# Patient Record
(demographics unavailable — no encounter records)

---

## 2017-06-21 NOTE — PHYS DOC
Past Medical History


Past Medical History:  Alcoholism, COPD, Diabetes-Type II, High Cholesterol, 

Hypertension


Past Surgical History:  No Surgical History


Alcohol Use:  Heavy


Drug Use:  None





Adult General


Chief Complaint


Chief Complaint:  ALCOHOL INTOXICATION





HPI


HPI





Patient is a 57  year old male who presents by EMS for alcohol intoxication 

seeking alcohol rehabilitation/detox.  He acknowledges psychosocial stressors 

of drinking and wants to quit.  His last drink was this a.m.  He notes prior 

detox attempts.  He denies other complaints at this time.





Review of Systems


Review of Systems





Constitutional: Denies fever or chills []


Eyes: Denies change in visual acuity, redness, or eye pain []


HENT: Denies nasal congestion or sore throat []


Respiratory: Denies cough or shortness of breath []


Cardiovascular: No additional information not addressed in HPI []


GI: Denies abdominal pain, nausea, vomiting, bloody stools or diarrhea []


: Denies dysuria or hematuria []


Musculoskeletal: Denies back pain or joint pain []


Integument: Denies rash or skin lesions []


Neurologic: Denies headache, focal weakness or sensory changes []


Endocrine: Denies polyuria or polydipsia []





Current Medications


Current Medications





Current Medications








 Medications


  (Trade)  Dose


 Ordered  Sig/Ludmila  Start Time


 Stop Time Status Last Admin


Dose Admin


 


 Sodium Chloride  1,000 ml @ 


 1,000 mls/hr  1X  ONCE  6/21/17 16:00


 6/21/17 16:59 DC 6/21/17 16:09


1,000 MLS/HR











Allergies


Allergies





Allergies








Coded Allergies Type Severity Reaction Last Updated Verified


 


  No Known Drug Allergies    6/21/17 No











Physical Exam


Physical Exam





Constitutional: Well developed, well nourished, no acute distress, non-toxic 

appearance. []


HENT: Normocephalic, atraumatic, bilateral external ears normal, oropharynx 

moist, no oral exudates, nose normal. []


Eyes: PERRLA, EOMI, conjunctiva normal, no discharge. [] 


Neck: Normal range of motion, supple. [] 


Cardiovascular:Heart rate regular rhythm []


Lungs & Thorax:  Bilateral breath sounds clear to auscultation []


Abdomen: Bowel sounds normal, soft, no tenderness. [] 


Skin: Warm, dry, no erythema, no rash. [] 


Back: Normal ROM. [] 


Extremities: No tenderness, ROM intact, no edema. [] 


Neurologic: Alert and oriented X 3, normal motor function, normal sensory 

function, no focal deficits noted. []


Psychologic: Affect normal, judgement normal, mood normal. []





Current Patient Data


Vital Signs





 Vital Signs








  Date Time  Temp Pulse Resp B/P (MAP) Pulse Ox O2 Delivery O2 Flow Rate FiO2


 


6/21/17 15:26 98.6 111 20 177/101 (126) 92 Room Air  





 98.6       








Lab Values





 Laboratory Tests








Test


  6/21/17


15:30 6/21/17


16:30


 


White Blood Count


  3.6 x10^3/uL


(4.0-11.0)  L 


 


 


Red Blood Count


  4.66 x10^6/uL


(4.30-5.70) 


 


 


Hemoglobin


  14.8 g/dL


(13.0-17.5) 


 


 


Hematocrit


  42.8 %


(39.0-53.0) 


 


 


Mean Corpuscular Volume


  92 fL ()


  


 


 


Mean Corpuscular Hemoglobin 32 pg (25-35)   


 


Mean Corpuscular Hemoglobin


Concent 35 g/dL


(31-37) 


 


 


Red Cell Distribution Width


  16.1 %


(11.5-14.5)  H 


 


 


Platelet Count


  127 x10^3/uL


(140-400)  L 


 


 


Neutrophils (%) (Auto) 62 % (31-73)   


 


Lymphocytes (%) (Auto) 26 % (24-48)   


 


Monocytes (%) (Auto) 10 % (0-9)  H 


 


Eosinophils (%) (Auto) 1 % (0-3)   


 


Basophils (%) (Auto) 1 % (0-3)   


 


Neutrophils # (Auto)


  2.3 x10^3uL


(1.8-7.7) 


 


 


Lymphocytes # (Auto)


  0.9 x10^3/uL


(1.0-4.8)  L 


 


 


Monocytes # (Auto)


  0.4 x10^3/uL


(0.0-1.1) 


 


 


Eosinophils # (Auto)


  0.0 x10^3/uL


(0.0-0.7) 


 


 


Basophils # (Auto)


  0.0 x10^3/uL


(0.0-0.2) 


 


 


Sodium Level


  136 mmol/L


(136-145) 


 


 


Potassium Level


  3.6 mmol/L


(3.5-5.1) 


 


 


Chloride Level


  94 mmol/L


()  L 


 


 


Carbon Dioxide Level


  25 mmol/L


(21-32) 


 


 


Anion Gap 17 (6-14)  H 


 


Blood Urea Nitrogen


  18 mg/dL


(8-26) 


 


 


Creatinine


  1.1 mg/dL


(0.7-1.3) 


 


 


Estimated GFR


(Cockcroft-Gault) 69.0  


  


 


 


Glucose Level


  186 mg/dL


(70-99)  H 


 


 


Calcium Level


  8.7 mg/dL


(8.5-10.1) 


 


 


Ethyl Alcohol Level


  288 mg/dL


(0-10)  H 


 


 


Urine Opiates Screen  Neg (NEG)  


 


Urine Methadone Screen  Neg (NEG)  


 


Urine Barbiturates  Neg (NEG)  


 


Urine Phencyclidine Screen  Neg (NEG)  


 


Urine


Amphetamine/Methamphetamine 


  Neg (NEG)  


 


 


Urine Benzodiazepines Screen  Neg (NEG)  


 


Urine Cocaine Screen  Neg (NEG)  


 


Urine Cannabinoids Screen  Neg (NEG)  


 


Urine Ethyl Alcohol  Pos (NEG)  





 Laboratory Tests


6/21/17 15:30








 Laboratory Tests


6/21/17 15:30














Course & Med Decision Making


Course & Med Decision Making


Pertinent Labs and Imaging studies reviewed. (See chart for details)





Other than elevated alcohol level, laboratory evaluation is unremarkable. PAT 

 has seen and evaluated him. There are no detox beds available at 

this time. Resources have been provided to follow-up with detox facilities 

tomorrow. He will be prescribed Librium in anticipation of detox facility 

admission for alcohol withdrawal. Instructed him not to use this prescription 

outside of detox facility. Return precautions given. He understands and agrees 

with plan.





Dragon Disclaimer


Dragon Disclaimer


This electronic medical record was generated, in whole or in part, using a 

voice recognition dictation system.





Departure


Departure


Impression:  


 Primary Impression:  


 Alcohol intoxication


Disposition:  01 HOME, SELF-CARE


Condition:  STABLE


Referrals:  


NO PCP (PCP)


Patient Instructions:  Alcohol Intoxication, Easy-to-Read





Additional Instructions:  


You have been supplied resources for substance abuse facility. Please follow up 

with substance abuse facility for help with alcohol detox. Take Librium at 

alcohol detox facility, otherwise decrease your alcohol intake gradually. Follow

-up with your primary care doctor otherwise. Return for any concerns.


Scripts


Chlordiazepoxide Hcl (CHLORDIAZEPOXIDE HCL) 10 Mg Capsule


10 MG PO QID Y for ALCOHOL WITHDRAWAL, #12 CAP


   Prov: GAL SANTORO MD         6/21/17





Problem Qualifiers








 Primary Impression:  


 Alcohol intoxication


 Complication of substance-induced condition:  uncomplicated  Qualified Codes:  

F10.920 - Alcohol use, unspecified with intoxication, uncomplicated








GAL SANTORO MD Jun 21, 2017 16:11

## 2017-08-13 NOTE — RAD
EXAM: CT head without contrast

 

HISTORY: 57-year-old male with seizure, post alcohol withdrawal.

 

COMPARISON: None.

 

TECHNIQUE: Computed tomographic images of the head were obtained without 

contrast.

 

RS compliance statement:  One or more of the following individualized 

dose reduction techniques were utilized for this examination:  1. 

Automated exposure control  2. Adjustment of the mA and/or kV according to

patient size  3. Use of iterative reconstruction technique

 

FINDINGS: 

There is no acute intracranial process identified. Specifically, there are

no intracranial blood products, extra-axial fluid collections, mass effect

or midline shift.  Ventricles and basilar cisterns are maintained. Mild 

cerebral atrophy is present, with resulting prominence of the sulci and 

ventricles.

 

The visualized portions of the orbits, paranasal sinuses and mastoid air 

cells are unremarkable. No suspicious calvarial lesion is seen.

 

IMPRESSION: 

No acute intracranial findings.

 

Electronically signed by: Lisa Toney MD (8/13/2017 1:07 AM) [facility]

## 2017-08-13 NOTE — EKG
[facility]

              [address] [place] [address]

Test Date:    2017               Test Time:    00:46:41

Pat Name:     MATT ALCARAZ          Department:   

Patient ID:   [id number]           Room:          

Gender:       M                        Technician:   

:          1959               Requested By: CAROL ANDREW

Order Number: [id number]            Reading MD:     

                                 Measurements

Intervals                              Axis          

Rate:         101                      P:            42

CA:           136                      QRS:          -58

QRSD:         126                      T:            32

QT:           366                                    

QTc:          475                                    

                           Interpretive Statements

SINUS TACHYCARDIA

ABNORMAL LEFT AXIS DEVIATION

RIGHT BUNDLE BRANCH BLOCK

RVH WITH REPOLARIZATION ABNORMALITY

QRS(T) CONTOUR ABNORMALITY

CONSISTENT WITH INFERIOR INFARCT

PROBABLY OLD

RI6.01          Unconfirmed report

No previous ECG available for comparison

## 2017-08-13 NOTE — PHYS DOC
Past Medical History


Past Medical History:  Alcoholism, COPD, Diabetes-Type II, High Cholesterol, 

Hypertension


Past Surgical History:  No Surgical History


Alcohol Use:  Heavy


Additional Information:  


PT REPORTS HE HAS NOT HAD A DRINK IN THE LAST 6 DAYS.


Drug Use:  None





Adult General


Chief Complaint


Chief Complaint:  SEIZURE





HPI


HPI





Patient is a 57  year old gentleman who presents to the ER today secondary to a 

seizure was witnessed by his wife. Patient has a history significant for 

diabetes and hypertension. Patient is a history of high cholesterol and COPD. 

Patient denies any liver  kidney problems. Patient denies any prior seizures in 

the past. Patient denies any drug use. Patient reports he is not allergic to 

any medications. Patient reports he does smoke. She reports that he is a heavy 

alcoholic. Patient reports he is attempting to detox himself at this time. 

Patient reports his last drink was 6 days ago. Patient reports that he has had 

episodes of withdrawal symptoms in the past but has never had any seizures. 

Patient denies any fevers shakes chills nausea vomiting or diarrhea. Patient 

has any abdominal pain chest pain or short of breath. Patient reports that he 

does use inhalers for his asthma. Patient reports that he lives with his wife. 

Patient reports that he's been having tremors over the last couple days which 

is not unusual for him.








Review of systems:


Constitutional: Denies fever or chills          


Eyes: Denies change in visual acuity, redness, or eye pain   


HENT: Denies nasal congestion or sore throat 


All other review systems are negative except as documented in the history of 

present illness portion.








Physical exam:


Constitutional: Well developed, well nourished, no acute distress, non-toxic 

appearance. Patient does have a resting hand tremor


HENT: Normocephalic, atraumatic, bilateral external ears normal, 


Eyes:  EOMI, conjunctiva normal, no discharge.  No tongue fasciculations


Neck: Normal range of motion, no tenderness, supple, no stridor.  


Cardiovascular: Regular rate tachycardic to 105


Lungs & Thorax:  Bilateral breath sounds clear to auscultation no wheezing 

rales or rhonchi


Abdomen: Bowel sounds normal, soft, no tenderness, 


Skin: Warm, dry, no erythema, no rash.  No diaphoresis


Back: No tenderness, no CVA tenderness.  


Extremities:  ROM intact, no edema.  


Neurologic: Alert and oriented X 3, normal motor function, normal sensory 

function, no focal deficits noted. Normal gait. Normal cerebellar exam. Patient 

does have a resting tremor.


Psychologic: Affect normal, judgement normal, mood normal. 





Patient's evaluation in the ER was significant for a resting tremor. Patient is 

tachycardic upon arrival. Patient is alert awake and oriented 3. Does not 

appear to be postictal at this time. Patient has no tongue bite. Patient not 

lose bowel or bladder function. Patient denies any head trauma.


0130  patient reports he feels much improved. Patient was given Ativan in the 

ED as well as IV fluids. Patient reports it is resting tremor has improved 

significantly. Patient is requesting to be discharged. I have discussed with 

the patient the plan to admit him for further evaluation of his seizures and 

his alcohol withdrawal. Patient is refusing admission. Patient has agreed to 

stay in the ER for an additional hour for me to continue watching him however 

he will sign out AGAINST MEDICAL ADVICE after that.





Patient's CT scan of his head reveals no acute pathology.


Patient's lab workup in ER has been unremarkable. Patient is slightly 

hypermobile anemic and hypomagnesemia.  Patient has received IV fluids through 

a banana bag as well as Ativan in the ER.


Patient reports that he does not want assistance with detox. Patient reports 

that he's had multiple episodes of unsuccessful detox in the past and is 

greatest excesses when he doesn't on his own. We did have the psychiatric 

assessment team evaluate him and they did give him resources. Patient was again 

has declined any assistance from us regarding his detox.





0250  patient is standing at the doorway requesting to be discharged. Patient 

once again is refusing admission to the hospital. I did discuss with the 

patient my concern with his new onset seizures, his alcohol withdrawal, my 

concern that he might fall and hurt himself. Patient is alert awake and 

oriented 3 currently. Patient has capacity for medical decision making. 

Patient is competent to make his own decisions at this time. We will allow him 

to make his own decision to sign out AGAINST MEDICAL ADVICE. We will respect 

his autonomy and allow him to be discharged AGAINST MEDICAL ADVICE. Patient 

understands that he may return at any time for further assistance. Patient 

lives with his wife and he reports that his wife. Benign.








Assessment and plan


Alcohol withdrawal with seizures. Patient monitored in the ER for several hours 

with some improvement in his symptoms. I have recommended admission to the 

patient however he is refusing. Patient will be discharged AGAINST MEDICAL 

ADVICE and was instructed to return to the ER a few changes mind. Patient has 

been given instructions and resources for alcohol resources.





Current Medications


Current Medications





Current Medications








 Medications


  (Trade)  Dose


 Ordered  Sig/Ludmila  Start Time


 Stop Time Status Last Admin


Dose Admin


 


 Lorazepam


  (Ativan)  2 mg  1X  ONCE  8/13/17 01:00


 8/13/17 01:01 DC 8/13/17 01:05


2 MG


 


 Multivitamins 10


 ml/Folic Acid 1


 mg/Thiamine HCl


 100 mg/Sodium


 Chloride  1,011.2 ml


  @ 1,000 mls/


 hr  1X  ONCE  8/13/17 01:00


 8/13/17 02:00 DC 8/13/17 01:18


1,000 MLS/HR











Allergies


Allergies





Allergies








Coded Allergies Type Severity Reaction Last Updated Verified


 


  No Known Drug Allergies    6/21/17 No











Current Patient Data


Vital Signs





 Vital Signs








  Date Time  Temp Pulse Resp B/P (MAP) Pulse Ox O2 Delivery O2 Flow Rate FiO2


 


8/13/17 00:23 99.2 117 16 154/92 (112) 100 Room Air  





 99.2       








Lab Values





 Laboratory Tests








Test


  8/13/17


00:45 8/13/17


01:51


 


White Blood Count


  7.2 x10^3/uL


(4.0-11.0) 


 


 


Red Blood Count


  4.23 x10^6/uL


(4.30-5.70)  L 


 


 


Hemoglobin


  13.8 g/dL


(13.0-17.5) 


 


 


Hematocrit


  39.4 %


(39.0-53.0) 


 


 


Mean Corpuscular Volume


  93 fL ()


  


 


 


Mean Corpuscular Hemoglobin 33 pg (25-35)   


 


Mean Corpuscular Hemoglobin


Concent 35 g/dL


(31-37) 


 


 


Red Cell Distribution Width


  15.8 %


(11.5-14.5)  H 


 


 


Platelet Count


  154 x10^3/uL


(140-400) 


 


 


Neutrophils (%) (Auto) 65 % (31-73)   


 


Lymphocytes (%) (Auto) 19 % (24-48)  L 


 


Monocytes (%) (Auto) 13 % (0-9)  H 


 


Eosinophils (%) (Auto) 2 % (0-3)   


 


Basophils (%) (Auto) 1 % (0-3)   


 


Neutrophils # (Auto)


  4.7 x10^3uL


(1.8-7.7) 


 


 


Lymphocytes # (Auto)


  1.4 x10^3/uL


(1.0-4.8) 


 


 


Monocytes # (Auto)


  1.0 x10^3/uL


(0.0-1.1) 


 


 


Eosinophils # (Auto)


  0.1 x10^3/uL


(0.0-0.7) 


 


 


Basophils # (Auto)


  0.0 x10^3/uL


(0.0-0.2) 


 


 


Sodium Level


  135 mmol/L


(136-145)  L 


 


 


Potassium Level


  3.2 mmol/L


(3.5-5.1)  L 


 


 


Chloride Level


  94 mmol/L


()  L 


 


 


Carbon Dioxide Level


  27 mmol/L


(21-32) 


 


 


Anion Gap 14 (6-14)   


 


Blood Urea Nitrogen


  22 mg/dL


(8-26) 


 


 


Creatinine


  1.4 mg/dL


(0.7-1.3)  H 


 


 


Estimated GFR


(Cockcroft-Gault) 52.2  


  


 


 


BUN/Creatinine Ratio 16 (6-20)   


 


Glucose Level


  128 mg/dL


(70-99)  H 


 


 


Calcium Level


  7.6 mg/dL


(8.5-10.1)  L 


 


 


Magnesium Level


  0.7 mg/dL


(1.8-2.4)  L 


 


 


Total Bilirubin


  0.6 mg/dL


(0.2-1.0) 


 


 


Aspartate Amino Transferase


(AST) 114 U/L


(15-37)  H 


 


 


Alanine Aminotransferase (ALT)


  106 U/L


(16-63)  H 


 


 


Alkaline Phosphatase


  73 U/L


() 


 


 


Total Protein


  7.1 g/dL


(6.4-8.2) 


 


 


Albumin


  3.3 g/dL


(3.4-5.0)  L 


 


 


Albumin/Globulin Ratio


  0.9 (1.0-1.7)


L 


 


 


Lipase


  352 U/L


() 


 


 


Ethyl Alcohol Level


  < 10 mg/dL


(0-10) 


 


 


Urine Collection Type  Unknown  


 


Urine Color  Yellow  


 


Urine Clarity  Clear  


 


Urine pH  7.5  


 


Urine Specific Gravity  1.015  


 


Urine Protein


  


  >=300 mg/dL


(NEG-TRACE)


 


Urine Glucose (UA)


  


  Negative mg/dL


(NEG)


 


Urine Ketones (Stick)


  


  Negative mg/dL


(NEG)


 


Urine Blood  Large (NEG)  


 


Urine Nitrite


  


  Negative (NEG)


 


 


Urine Bilirubin


  


  Negative (NEG)


 


 


Urine Urobilinogen Dipstick


  


  0.2 mg/dL (0.2


mg/dL)


 


Urine Leukocyte Esterase


  


  Negative (NEG)


 


 


Urine RBC


  


  Occ /HPF (0-2)


 


 


Urine WBC


  


  Occ /HPF (0-4)


 


 


Urine Squamous Epithelial


Cells 


  Few /LPF  


 


 


Urine Bacteria


  


  0 /HPF (0-FEW)


 


 


Urine Hyaline Casts  Few /HPF  


 


Urine Opiates Screen  Neg (NEG)  


 


Urine Methadone Screen  Neg (NEG)  


 


Urine Barbiturates  Neg (NEG)  


 


Urine Phencyclidine Screen  Neg (NEG)  


 


Urine


Amphetamine/Methamphetamine 


  Neg (NEG)  


 


 


Urine Benzodiazepines Screen  Neg (NEG)  


 


Urine Cocaine Screen  Neg (NEG)  


 


Urine Cannabinoids Screen  Neg (NEG)  


 


Urine Ethyl Alcohol  Neg (NEG)  





 Laboratory Tests


8/13/17 00:45








 Laboratory Tests


8/13/17 00:45














EKG


EKG


[]





Radiology/Procedures


Radiology/Procedures


[]





Course & Med Decision Making


Course & Med Decision Making


Pertinent Labs and Imaging studies reviewed. (See chart for details)





[]





Dragon Disclaimer


Dragon Disclaimer


This electronic medical record was generated, in whole or in part, using a 

voice recognition dictation system.





Departure


Departure


Impression:  


 Primary Impression:  


 Alcohol withdrawal


 Additional Impressions:  


 Seizures


 Hypokalemia


 Hypomagnesemia


 Left against medical advice


Disposition:  01 HOME, SELF-CARE


Condition:  IMPROVED


Referrals:  


NO PCP (PCP)


Patient Instructions:  Alcohol Withdrawal, Chronic Alcoholism, Discharge 

Against Medical Advice, Epilepsy





Additional Instructions:  


Please return to the ER if he should change her mind about wanting to be 

admitted and getting further treatment for your alcoholism. And seizures





Problem Qualifiers











CAROL ANDREW MD Aug 13, 2017 02:54

## 2017-10-21 NOTE — EKG
[facility]

              [address] [place] [address]

Test Date:    2017-10-21               Test Time:    09:44:17

Pat Name:     MATT ALCARAZ          Department:   

Patient ID:   [id number]           Room:          

Gender:       M                        Technician:   

:          1959               Requested By: REY MENDEZ

Order Number: [id number]            Reading MD:     

                                 Measurements

Intervals                              Axis          

Rate:         135                      P:            -151

AZ:           76                       QRS:          -71

QRSD:         132                      T:            33

QT:           326                                    

QTc:          494                                    

                           Interpretive Statements

SINUS TACHYCARDIA

ABNORMAL LEFT AXIS DEVIATION

RIGHT BUNDLE BRANCH BLOCK

RVH WITH REPOLARIZATION ABNORMALITY

QRS(T) CONTOUR ABNORMALITY

CONSIDER ANTEROLATERAL MYOCARDIAL DAMAGE

CONSISTENT WITH INFERIOR INFARCT

PROBABLY OLD

ABNORMAL ECG

RI6.01

No previous ECG available for comparison

## 2017-10-21 NOTE — ED.ADGEN
Past Medical History


Past Medical History:  Alcoholism, COPD, Diabetes-Type II, High Cholesterol, 

Hypertension


Past Surgical History:  No Surgical History


Alcohol Use:  Heavy


Drug Use:  None





Adult General


Chief Complaint


Chief Complaint:  WITHDRAWL





HPI


HPI





Patient is a 57  year old man, history of type 2 diabetes mellitus, hypertension

, hyperlipidemia, alcohol addiction, with history of withdrawal seizures, who 

presents to the emergency department after a witnessed seizure. Patient states 

he stopped drinking 3 days ago because "I'm tired of all of this", states that 

he is attempting to detox on his own. He states that he is not interested in 

being admitted to the hospital, states "I can do this on my own". Patient lives 

at bedside. She states that she heard him "making a snoring noise", states he 

has not slept in 3 days, states she came in to the ROM, she noted that he was 

lying on the bed, and was "shaking and had his eyes rolled back", consistent 

with previous withdrawal seizures. EMS was then called, upon tiredness arrival, 

patient was slightly postictal, this time he is alert a and O 4. Patient noted 

be hypoxic on room air, 88%, placed on 2 L nasal cannula with oxygen 

saturations in the mid 90s, patient states that he isn't experiencing cough and 

congestion over the past several days as well. He denies any fevers or chills, 

any numbness weakness or tingling, any headache, states he has had some 

vomiting but denies any abdominal pain, any injuries. Patient states here 

members coughing, and then "I don't remember what happened after that", this 

occurred just before the patient's wife witnessed him having a seizure. His 

last episode of alcohol withdrawal seizures was "years ago". Patient states he 

has not had any other seizures except for from alcohol withdrawal.





Review of Systems


Review of Systems


Constitutional:  Denies fever or chills. []


Eyes:  Denies change in visual acuity. []


HENT:  Denies nasal congestion or sore throat. [] 


Respiratory:  Denies cough or shortness of breath. [] 


Cardiovascular:  Denies chest pain or edema. [] 


GI:  Denies abdominal pain, bloody stools or diarrhea. Nausea and vomiting 3 

days.] 


:  Denies dysuria. [] 


Musculoskeletal:  Denies back pain or joint pain. [] 


Integument:  Denies rash. [] 


Neurologic:  Denies headache, focal weakness or sensory changes. Witnessed 

seizure activity.[] 


Endocrine:  Denies polyuria or polydipsia. [] 


Lymphatic:  Denies swollen glands. [] 


Psychiatric:  Denies depression or anxiety. []





Current Medications


Current Medications





Current Medications








 Medications


  (Trade)  Dose


 Ordered  Sig/Ludmila  Start Time


 Stop Time Status Last Admin


Dose Admin


 


 Diazepam


  (Valium)  10 mg  PRN Q5MIN  PRN  10/21/17 09:15


     


 


 


 Multivitamins 10


 ml/Thiamine HCl


 100 mg/Folic Acid


 1 mg/Sodium


 Chloride  1,011.2 ml


  @ 100 mls/


 hr  DAILY  10/22/17 09:00


 10/26/17 19:07  10/21/17 09:37


100 MLS/HR











Allergies


Allergies





Allergies








Coded Allergies Type Severity Reaction Last Updated Verified


 


  No Known Drug Allergies    17 No











Physical Exam


Physical Exam





Constitutional: Well developed, well nourished, no acute distress, non-toxic 

appearance. Nasal cannula in place.[]


HENT: Normocephalic, atraumatic, bilateral external ears normal, oropharynx 

moist, no oral exudates, nose normal. []


Eyes: PERRLA, EOMI, conjunctiva normal, no discharge. [] 


Neck: Normal range of motion, no tenderness, supple, no stridor. [] 


Cardiovascular:, Tachycardic, S1, S2, rubs or gallops.[]


Lungs & Thorax: Diminished breath sounds at bases bilaterally, no rhonchi or 

rales. No wheezing.


Abdomen: Bowel sounds normal, soft, obese, no rebound, rigidity, no guarding no 

tenderness, no masses, no pulsatile masses. [] 


Skin: Warm, dry, no erythema, no rash. [] 


Back: No tenderness, no CVA tenderness. [] 


Extremities: No tenderness, no cyanosis, no clubbing, ROM intact, no edema. 

Negative Homans sign.[] 


Neurologic: Alert and oriented X 3, normal motor function, normal sensory 

function, no focal deficits noted. []


Psychologic: Affect normal, judgement normal, mood normal. []





Current Patient Data


Vital Signs





 Vital Signs








  Date Time  Temp Pulse Resp B/P (MAP) Pulse Ox O2 Delivery O2 Flow Rate FiO2


 


10/21/17 09:14 99.2 136 16 154/92 (112) 89 Room Air  





 99.2       











EKG


EKG


EC: Sinus tachycardia, heart rate 135 beats/minute, left axis deviation 

with right bundle branch block noted, right ventricular hypertrophy with 

hyperpolarization abnormality noted, contour normality is noted in the inferior 

and in the anterior leads, QTc of 494, WI of 76, QRS of 132, abnormal ECG, does 

not meet STEMI criteria.[]





Radiology/Procedures


Radiology/Procedures


[][address] [address]


 [phone]


 


 IMAGING REPORT





 Signed





PATIENT: MATT ALCARAZ ACCOUNT: [id number] MRN#: [id number]


: 1959 LOCATION: ER AGE: 57


SEX: M EXAM DT: 10/21/17 ACCESSION#: [id number]


STATUS: REG ER ORD. PHYSICIAN: REY MENDEZ DO 


REASON: SEIZURE, WEAKNESS


PROCEDURE: CHEST AP ONLY








AP portable chest  radiograph 10/21/2017





Clinical History: Weakness.





An AP portable erect digital radiograph of the chest was obtained. Comparison


study is dated 2009.





The cardiac silhouette is normal in size. The thoracic aorta is minimally


tortuous. A 2 cm calcified granuloma overlies the right lower lobe, unchanged.


No acute pulmonary infiltrate is seen. No pleural effusion or pneumothorax is


noted. Degenerative changes are seen involving the thoracic spine and both


shoulders.





Impression: No acute abnormality is seen.














DICTATED and SIGNED BY:     KVNG BERNSTEIN MD


DATE:     10/21/17 0950





CC: REY MENDEZ DO; NO PCP ~


Impressions:


[address] [id number] [phone]


 


 IMAGING REPORT





 Signed





PATIENT: MATT ALCARAZ ACCOUNT: [id number] MRN#: [id number]


: 1959 LOCATION: ER AGE: 57


SEX: M EXAM DT: 10/21/17 ACCESSION#: [id number]


STATUS: PRE ER ORD. PHYSICIAN: REY MENDEZ DO 


REASON: SZ/EtOH abuse


PROCEDURE: CT HEAD WO CONTRAST








CT scan of the head without contrast 10/21/2017





Clinical History: Seizure.





Technique: Unenhanced, contiguous, 5 mm axial sections were obtained through


the head.





One or more of the following individualized dose reduction techniques were


utilized for this study:





1. Automated exposure control.


2. Adjustment of the mA and/or kV according to patient size.


3. Use of iterative reconstruction technique. 





Findings: Comparison study is dated 2017





There is generalized parenchymal atrophy.  Small scattered areas of decreased


attenuation are seen within the periventricular and subcortical white matter


of both cerebral hemispheres consistent with areas of small vessel ischemic


disease. No acute parenchymal abnormality is seen. No extra-axial fluid


collection is noted. No skull fracture is seen. Moderate mucosal thickening is


seen involving the left maxillary sinus.





Impression:  No acute intracranial abnormality is seen.














DICTATED and SIGNED BY:     KVNG BERNSTEIN MD


DATE:     10/21/17 0936





CC: REY MENDEZ DO; NO PCP ~





Course & Med Decision Making


Course & Med Decision Making


Pertinent Labs and Imaging studies reviewed. (See chart for details)





Lengthy discussion at bedside with patient and wife. Patient is adamant that he 

is going to leave the emergency department, states "I can't afford to be 

admitted". I discussed with patient at length in detail my concerns about his 

hypoxia, and concern for recurrent seizure, states that he is high risk for 

morbidity, or mortality, including stroke, recurrent seizure, and death. Patient

's wife reiterates my concerns, patient states that he fully understands the 

concerns, understand that he is at risk for significant injury or even death, 

states "I will be fine", states" I've done this before and I will do it again. 

Patient's heart rate is now in the 90s, he has not had further seizure activity 

in the ED but has received Valium, and I discussed with him that the 

benzodiazepine will wear off, and the long-term chemical effects of alcohol 

abuse in the brain and other organs. Patient states that he understands, all 

these concerns, but again is alert and oriented 4, is calm and cooperative, 

appropriate in his responses, and therefore AMA paperwork was completed with 

nurse Aparicio, and patient did exit the emergency Department with his wife 

AGAINST MEDICAL ADVICE, with precautions and instructions he can return to the 

emergency department any time.





Dragon Disclaimer


Dragon Disclaimer


This electronic medical record was generated, in whole or in part, using a 

voice recognition dictation system.





Departure


Impression:  


 Primary Impression:  


 Left against medical advice


 Additional Impressions:  


 Alcohol withdrawal


 Alcohol withdrawal seizure


Disposition:  07 AGAINST MEDICAL ADVICE


Condition:  STABLE





Problem Qualifiers











REY MENDEZ DO Oct 21, 2017 11:14